# Patient Record
Sex: MALE | Race: WHITE | NOT HISPANIC OR LATINO | URBAN - NONMETROPOLITAN AREA
[De-identification: names, ages, dates, MRNs, and addresses within clinical notes are randomized per-mention and may not be internally consistent; named-entity substitution may affect disease eponyms.]

---

## 2021-07-09 ENCOUNTER — IMPORTED ENCOUNTER (OUTPATIENT)
Dept: URBAN - NONMETROPOLITAN AREA CLINIC 1 | Facility: CLINIC | Age: 46
End: 2021-07-09

## 2021-07-09 PROBLEM — H52.4: Noted: 2021-07-09

## 2021-07-09 PROBLEM — H25.012: Noted: 2021-07-09

## 2021-07-09 PROCEDURE — S0620 ROUTINE OPHTHALMOLOGICAL EXA: HCPCS

## 2021-07-09 NOTE — PATIENT DISCUSSION
Myopia / Astigmatism / Presbyopia OU - Discussed diagnosis in detail with patient- MR re-checked by Dr. Brynn Rodríguez and patient is dilated would like to bring patient back when not dilated and MR to be done by Dr. Brynn Rodríguez  - Continue to monitor- RTC 1 year complete Cataracts OS- Discussed diagnosis in detail with patient- Discussed signs and symptoms of progression- Discussed UV protection- No treatment needed at this time - Continue to monitor

## 2021-07-16 ENCOUNTER — IMPORTED ENCOUNTER (OUTPATIENT)
Dept: URBAN - NONMETROPOLITAN AREA CLINIC 1 | Facility: CLINIC | Age: 46
End: 2021-07-16

## 2021-07-16 PROCEDURE — 92015 DETERMINE REFRACTIVE STATE: CPT

## 2021-07-16 NOTE — PATIENT DISCUSSION
Refraction - Discussed diagnosis in detail with patient - MR done today by Dr Tobias Chiang and new glasses RX given- Continue to monitor PREVIOUS NOTES Myopia / Astigmatism / Presbyopia OU - Discussed diagnosis in detail with patient- MR re-checked by Dr. Toño Chiang and patient is dilated would like to bring patient back when not dilated and MR to be done by Dr. Toño Chiang  - Continue to monitor- RTC 1 year complete Cataracts OS- Discussed diagnosis in detail with patient- Discussed signs and symptoms of progression- Discussed UV protection- No treatment needed at this time - Continue to monitor

## 2021-10-15 NOTE — PATIENT DISCUSSION
Submitted paperwork and exam findings to [id number] [facility],[address] for disability determination.

## 2021-10-15 NOTE — PATIENT DISCUSSION
Not visually significant. Patient can continue with current glasses. Should be rechecked yearly for progression.

## 2021-10-15 NOTE — PATIENT DISCUSSION
Discussed vision loss from strokes is permanent and we do not expect recovery of left side of visual field. Based on vision and peripheral vision loss, patient ed that he does not meet 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

## 2022-04-15 ASSESSMENT — TONOMETRY
OD_IOP_MMHG: 12
OS_IOP_MMHG: 12

## 2022-04-15 ASSESSMENT — VISUAL ACUITY
OS_SC: 20/30-1
OS_SC: 20/25
OD_SC: 20/25-
OD_SC: 20/25-2

## 2024-11-15 ENCOUNTER — NEW PATIENT (OUTPATIENT)
Dept: URBAN - NONMETROPOLITAN AREA CLINIC 1 | Facility: CLINIC | Age: 49
End: 2024-11-15

## 2024-11-15 DIAGNOSIS — H52.4: ICD-10-CM

## 2024-11-15 PROCEDURE — 92015 DETERMINE REFRACTIVE STATE: CPT

## 2024-11-15 PROCEDURE — 92004 COMPRE OPH EXAM NEW PT 1/>: CPT

## 2024-12-09 ENCOUNTER — CONTACT LENSES/GLASSES VISIT (OUTPATIENT)
Age: 49
End: 2024-12-09

## 2024-12-09 DIAGNOSIS — H52.4: ICD-10-CM

## 2024-12-09 PROCEDURE — 92015 DETERMINE REFRACTIVE STATE: CPT | Mod: NC
